# Patient Record
Sex: MALE | Race: WHITE | Employment: OTHER | URBAN - METROPOLITAN AREA
[De-identification: names, ages, dates, MRNs, and addresses within clinical notes are randomized per-mention and may not be internally consistent; named-entity substitution may affect disease eponyms.]

---

## 2017-07-07 PROBLEM — Z95.828 S/P AORTOBIFEMORAL BYPASS SURGERY: Chronic | Status: ACTIVE | Noted: 2017-07-07

## 2018-08-28 ENCOUNTER — OFFICE VISIT (OUTPATIENT)
Dept: PULMONOLOGY | Age: 68
End: 2018-08-28
Payer: MEDICARE

## 2018-08-28 VITALS
TEMPERATURE: 98.4 F | OXYGEN SATURATION: 95 % | DIASTOLIC BLOOD PRESSURE: 56 MMHG | WEIGHT: 205 LBS | BODY MASS INDEX: 31.07 KG/M2 | SYSTOLIC BLOOD PRESSURE: 118 MMHG | HEART RATE: 82 BPM | HEIGHT: 68 IN

## 2018-08-28 DIAGNOSIS — J43.2 CENTRILOBULAR EMPHYSEMA (HCC): ICD-10-CM

## 2018-08-28 LAB
DLCO %PRED: NORMAL
DLCO PRE: NORMAL
FEF 25-75%-POST: 0.8
FEF 25-75%-PRE: 0.7
FEV1-POST: 1.93
FEV1-PRE: 1.85
FEV1/FVC-POST: 56
FEV1/FVC-PRE: 58
FVC-POST: 3.44
FVC-PRE: 3.18
MEP: NORMAL
MIP: NORMAL
TLC %PRED: NORMAL
TLC PRE: NORMAL

## 2018-08-28 PROCEDURE — 99203 OFFICE O/P NEW LOW 30 MIN: CPT | Performed by: INTERNAL MEDICINE

## 2018-08-28 PROCEDURE — 99204 OFFICE O/P NEW MOD 45 MIN: CPT | Performed by: INTERNAL MEDICINE

## 2018-08-28 PROCEDURE — 94060 EVALUATION OF WHEEZING: CPT | Performed by: INTERNAL MEDICINE

## 2018-08-28 ASSESSMENT — PULMONARY FUNCTION TESTS
FVC_POST: 3.44
FEV1/FVC_PRE: 58
FEV1_PRE: 1.85
FEV1/FVC_POST: 56
FEV1_POST: 1.93
FVC_PRE: 3.18

## 2018-08-28 NOTE — PROGRESS NOTES
(SYMBICORT) 80-4.5 MCG/ACT AERO Inhale 2 puffs into the lungs 2 times daily Rinse mouth after use. 10.2 g 1    tiotropium (SPIRIVA RESPIMAT) 2.5 MCG/ACT AERS inhaler Inhale 2 puffs into the lungs daily 4 g 1    lisinopril-hydrochlorothiazide (PRINZIDE;ZESTORETIC) 20-12.5 MG per tablet TAKE 1 TABLET BY MOUTH DAILY 90 tablet 0    metoprolol succinate (TOPROL XL) 25 MG extended release tablet TAKE 1 TABLET BY MOUTH DAILY 90 tablet 3    isosorbide mononitrate (IMDUR) 60 MG extended release tablet TAKE 1 TABLET BY MOUTH DAILY 90 tablet 3    omeprazole (PRILOSEC) 40 MG delayed release capsule Take 1 capsule by mouth Daily 90 capsule 1    atorvastatin (LIPITOR) 80 MG tablet TAKE 1 TABLET BY MOUTH DAILY 90 tablet 3    aspirin 325 MG EC tablet Take 325 mg by mouth daily       therapeutic multivitamin-minerals (THERAGRAN-M) tablet Take 1 tablet by mouth daily. No current facility-administered medications for this visit. 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 Andi Najera- SVG-OM, SVG-PDA, LIMA-LAD- Open, SVG-CX closed    CARDIAC SURGERY      HERNIA REPAIR      UPPER GASTROINTESTINAL ENDOSCOPY      VASCULAR SURGERY  11-9-11    aorto-femoral bypass       FAMILY HISTORY:   Lung cancer:  DVT or PE     REVIEW OF SYSTEMS:  Constitutional: General health is good . There has been no weight changes. No fevers, fatigue or weakness. Head: Patient denies any history of trauma, convulsive disorder or syncope. Skin:  Patient denies history of changes in pigmentation, eruptions or pruritus. No easy bruising or bleeding.   EENT: no nasal congestion   Cardiovascular response  FEV1/FVC 58  Spirometry compatible with moderate obstruction COPD  IMPRESSION:    1-Moderate COPD   2-Emphysema   3-Asthma   4-weight gain               PLAN:      - Patient is very pleasant 77-year-old male who follow with Dr. Mik Pat and he has been treated very nicely for COPD with Symbicort and Spiriva    His spirometry today shows moderate COPD  -My recommendation is to continue his Symbicort and Spiriva as better Dr. Mik Pat  -will need CAT scan screening giving his COPD stage to make sure no lung nodule that needs followed up for lung mass  -Need to have the flu and a pneumonia vaccine  Want to go to pulmonary rehab  Eosinophilic count and respiratory allergen profile    See him in 2 months and further recommendation to follow        Thank you for allowing me to participate in [facility]. I will keep following with you ,should you have any concerns ,please contact me at [phone]     Sincerely,        Natalie Feliciano MD  Pulmonary & Critical Care Medicine     NOTE: This report was transcribed using voice recognition software. Every effort was made to ensure accuracy; however, inadvertent computerized transcription errors may be present.

## 2018-08-28 NOTE — PROGRESS NOTES
New pt to see Dr. Aubrey Mcneill today in office. Pt to continue Spiriva 2.5 and Symbicort 160 inhalers; samples given per Dr. Leonila Feng's orders. To have lab work and CXR prior to next appt in 3 mos; appt given. Spirometry completed; Dr. Aubrey Mcneill to review results.

## 2018-08-29 ENCOUNTER — HOSPITAL ENCOUNTER (OUTPATIENT)
Dept: CARDIOLOGY | Age: 68
Discharge: HOME | End: 2018-08-29
Payer: MEDICARE

## 2018-08-29 ENCOUNTER — OFFICE VISIT (OUTPATIENT)
Dept: VASCULAR SURGERY | Age: 68
End: 2018-08-29
Payer: MEDICARE

## 2018-08-29 VITALS
HEIGHT: 68 IN | DIASTOLIC BLOOD PRESSURE: 77 MMHG | WEIGHT: 200 LBS | BODY MASS INDEX: 30.31 KG/M2 | HEART RATE: 65 BPM | SYSTOLIC BLOOD PRESSURE: 139 MMHG

## 2018-08-29 DIAGNOSIS — I77.1 SUBCLAVIAN ARTERY STENOSIS, LEFT (HCC): ICD-10-CM

## 2018-08-29 DIAGNOSIS — Z95.828 S/P AORTOBIFEMORAL BYPASS SURGERY: Primary | Chronic | ICD-10-CM

## 2018-08-29 DIAGNOSIS — I73.9 PVD (PERIPHERAL VASCULAR DISEASE) WITH CLAUDICATION (HCC): ICD-10-CM

## 2018-08-29 DIAGNOSIS — Z95.828 S/P AORTOBIFEMORAL BYPASS SURGERY: Chronic | ICD-10-CM

## 2018-08-29 DIAGNOSIS — Z95.820 STATUS POST ANGIOPLASTY WITH STENT: ICD-10-CM

## 2018-08-29 PROCEDURE — 99213 OFFICE O/P EST LOW 20 MIN: CPT | Performed by: SURGERY

## 2018-08-29 PROCEDURE — 93923 UPR/LXTR ART STDY 3+ LVLS: CPT

## 2018-08-29 NOTE — PROGRESS NOTES
Chief Complaint:   Chief Complaint   Patient presents with    Follow-up     results sono upper ext art le arterial         HPI:  Patient came to the office, for evaluation of peripheral vascular disease, post aortobifemoral bypass, 8 years ago, also history of left subclavian artery angioplasty and stent placement by Dr. Karen Kong, overall doing well, stays active, plays golf regularly      Patient denies any focal lateralizing neurological symptoms like loss of speech, vision or loss of function of extremity    Patient can walk a few blocks without difficulty, and denies any symptoms of rest pain    No Known Allergies    Current Outpatient Prescriptions   Medication Sig Dispense Refill    budesonide-formoterol (SYMBICORT) 80-4.5 MCG/ACT AERO Inhale 2 puffs into the lungs 2 times daily Rinse mouth after use. 10.2 g 1    tiotropium (SPIRIVA RESPIMAT) 2.5 MCG/ACT AERS inhaler Inhale 2 puffs into the lungs daily 4 g 1    lisinopril-hydrochlorothiazide (PRINZIDE;ZESTORETIC) 20-12.5 MG per tablet TAKE 1 TABLET BY MOUTH DAILY 90 tablet 0    metoprolol succinate (TOPROL XL) 25 MG extended release tablet TAKE 1 TABLET BY MOUTH DAILY 90 tablet 3    isosorbide mononitrate (IMDUR) 60 MG extended release tablet TAKE 1 TABLET BY MOUTH DAILY 90 tablet 3    omeprazole (PRILOSEC) 40 MG delayed release capsule Take 1 capsule by mouth Daily 90 capsule 1    atorvastatin (LIPITOR) 80 MG tablet TAKE 1 TABLET BY MOUTH DAILY 90 tablet 3    aspirin 325 MG EC tablet Take 325 mg by mouth daily       therapeutic multivitamin-minerals (THERAGRAN-M) tablet Take 1 tablet by mouth daily. No current facility-administered medications for this visit.         Past Medical History:   Diagnosis Date    Arthritis     Asthma     States Dr. Magdiel Rasmussen says he has it, but does not take medication for this    Atherosclerotic peripheral vascular disease ([facility] [address] 11/9/2011    Blood transfusion     Unsure    CAD (coronary artery disease)    

## 2018-08-30 NOTE — PROCEDURES
[address]                   [address] [facility],  [facility]                                  VASCULAR REPORT    PATIENT NAME: Fabiola De La Rosa                  :        1950  MED REC NO:   [id number]                            ROOM:  ACCOUNT NO:   [de-identified]                           ADMIT DATE: 2018  PROVIDER:     William Kendall MD    DATE OF PROCEDURE:  2018    LOWER EXTREMITY ARTERIAL DOPPLER STUDY    INDICATION:  Status post aortofemoral bypass surgery. FINDINGS:  The lower extremity arterial Doppler study revealed that the  patient has mild femoropopliteal arterial occlusive disease on the left  side with ankle-brachial index of 0.87, on the right side the  ankle-brachial index is normalized at 0.94, unchanged from last year.         Dc Lombardo MD    D: 2018 14:04:27       T: 2018 17:25:32     [email or web address]  Job#: [id number]     Doc#: [id number]    CC:

## 2018-10-24 RX ORDER — ISOSORBIDE MONONITRATE 60 MG/1
60 TABLET, EXTENDED RELEASE ORAL DAILY
Qty: 90 TABLET | Refills: 3 | Status: SHIPPED | OUTPATIENT
Start: 2018-10-24 | End: 2019-10-13 | Stop reason: SDUPTHER

## 2019-02-01 ENCOUNTER — OFFICE VISIT (OUTPATIENT)
Dept: PULMONOLOGY | Age: 69
End: 2019-02-01
Payer: MEDICARE

## 2019-02-01 VITALS
WEIGHT: 213 LBS | HEART RATE: 83 BPM | BODY MASS INDEX: 32.28 KG/M2 | TEMPERATURE: 98.1 F | DIASTOLIC BLOOD PRESSURE: 81 MMHG | RESPIRATION RATE: 14 BRPM | SYSTOLIC BLOOD PRESSURE: 139 MMHG | HEIGHT: 68 IN

## 2019-02-01 DIAGNOSIS — J43.2 CENTRILOBULAR EMPHYSEMA (HCC): ICD-10-CM

## 2019-02-01 PROCEDURE — 99213 OFFICE O/P EST LOW 20 MIN: CPT | Performed by: INTERNAL MEDICINE

## 2019-07-17 ENCOUNTER — HOSPITAL ENCOUNTER (OUTPATIENT)
Age: 69
Discharge: HOME | End: 2019-07-19
Payer: MEDICARE

## 2019-07-17 ENCOUNTER — HOSPITAL ENCOUNTER (OUTPATIENT)
Dept: GENERAL RADIOLOGY | Age: 69
Discharge: HOME | End: 2019-07-19
Payer: MEDICARE

## 2019-07-17 DIAGNOSIS — J43.2 CENTRILOBULAR EMPHYSEMA (HCC): ICD-10-CM

## 2019-07-17 PROCEDURE — 71046 X-RAY EXAM CHEST 2 VIEWS: CPT

## 2019-08-13 ENCOUNTER — OFFICE VISIT (OUTPATIENT)
Dept: PULMONOLOGY | Age: 69
End: 2019-08-13
Payer: MEDICARE

## 2019-08-13 VITALS
RESPIRATION RATE: 18 BRPM | OXYGEN SATURATION: 96 % | DIASTOLIC BLOOD PRESSURE: 81 MMHG | HEIGHT: 67 IN | SYSTOLIC BLOOD PRESSURE: 194 MMHG | WEIGHT: 205 LBS | BODY MASS INDEX: 32.18 KG/M2 | HEART RATE: 69 BPM

## 2019-08-13 DIAGNOSIS — J43.2 CENTRILOBULAR EMPHYSEMA (HCC): Primary | ICD-10-CM

## 2019-08-13 PROCEDURE — 99213 OFFICE O/P EST LOW 20 MIN: CPT | Performed by: INTERNAL MEDICINE

## 2019-08-13 NOTE — PROGRESS NOTES
bypass surgery 2017    Status post angioplasty with stent 2018       MEDICATIONS:   Current Outpatient Medications   Medication Sig Dispense Refill    aspirin 81 MG tablet Take 81 mg by mouth daily      budesonide-formoterol (SYMBICORT) 80-4.5 MCG/ACT AERO Inhale 2 puffs into the lungs 2 times daily . Rinse mouth after use. 30.59 g 3    atorvastatin (LIPITOR) 80 MG tablet TAKE 1 TABLET BY MOUTH EVERY DAY 90 tablet 0    metoprolol succinate (TOPROL XL) 25 MG extended release tablet Take 1 tablet by mouth daily 90 tablet 1    omeprazole (PRILOSEC) 40 MG delayed release capsule TAKE 1 CAPSULE BY MOUTH DAILY 90 capsule 1    isosorbide mononitrate (IMDUR) 60 MG extended release tablet Take 1 tablet by mouth daily 90 tablet 3    tiotropium (SPIRIVA RESPIMAT) 2.5 MCG/ACT AERS inhaler Inhale 2 puffs into the lungs daily 12 Inhaler 3    lisinopril-hydrochlorothiazide (PRINZIDE;ZESTORETIC) 20-12.5 MG per tablet TAKE 1 TABLET BY MOUTH DAILY 90 tablet 0    therapeutic multivitamin-minerals (THERAGRAN-M) tablet Take 1 tablet by mouth daily.  lisinopril-hydrochlorothiazide (PRINZIDE;ZESTORETIC) 20-12.5 MG per tablet TAKE 1 TABLET BY MOUTH DAILY (Patient not taking: Reported on 2019) 90 tablet 0    aspirin 325 MG EC tablet Take 325 mg by mouth daily        No current facility-administered medications for this visit.         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 Jamin Cooper- SVG-OM, SVG-PDA, LIMA-LAD- Open, SVG-CX closed   [facility]

## 2019-08-21 ENCOUNTER — TELEPHONE (OUTPATIENT)
Dept: VASCULAR SURGERY | Age: 69
End: 2019-08-21

## 2019-08-28 DIAGNOSIS — Z95.820 STATUS POST ANGIOPLASTY WITH STENT: ICD-10-CM

## 2019-08-28 DIAGNOSIS — I73.9 PVD (PERIPHERAL VASCULAR DISEASE) WITH CLAUDICATION (HCC): Primary | ICD-10-CM

## 2019-08-28 DIAGNOSIS — Z95.828 S/P AORTOBIFEMORAL BYPASS SURGERY: ICD-10-CM

## 2019-08-29 ENCOUNTER — OFFICE VISIT (OUTPATIENT)
Dept: VASCULAR SURGERY | Age: 69
End: 2019-08-29
Payer: MEDICARE

## 2019-08-29 ENCOUNTER — HOSPITAL ENCOUNTER (OUTPATIENT)
Dept: CARDIOLOGY | Age: 69
Discharge: HOME | End: 2019-08-29
Payer: MEDICARE

## 2019-08-29 DIAGNOSIS — Z95.820 STATUS POST ANGIOPLASTY WITH STENT: ICD-10-CM

## 2019-08-29 DIAGNOSIS — I73.9 PVD (PERIPHERAL VASCULAR DISEASE) WITH CLAUDICATION (HCC): ICD-10-CM

## 2019-08-29 DIAGNOSIS — R09.89 BRUIT OF LEFT CAROTID ARTERY: ICD-10-CM

## 2019-08-29 DIAGNOSIS — Z95.828 S/P AORTOBIFEMORAL BYPASS SURGERY: Chronic | ICD-10-CM

## 2019-08-29 DIAGNOSIS — I73.9 PVD (PERIPHERAL VASCULAR DISEASE) WITH CLAUDICATION (HCC): Primary | ICD-10-CM

## 2019-08-29 DIAGNOSIS — Z95.828 S/P AORTOBIFEMORAL BYPASS SURGERY: ICD-10-CM

## 2019-08-29 PROCEDURE — 93923 UPR/LXTR ART STDY 3+ LVLS: CPT

## 2019-08-29 PROCEDURE — 99214 OFFICE O/P EST MOD 30 MIN: CPT | Performed by: SURGERY

## 2019-08-29 NOTE — PROGRESS NOTES
Unsure    CAD (coronary artery disease)     COPD (chronic obstructive pulmonary disease) ([facility] [address] 2011    GERD (gastroesophageal reflux disease)     History of blood transfusion     History of tobacco use 2011    Hyperlipidemia     Hypertension     Hypertension 2011    PVD (peripheral vascular disease) with claudication ([facility] [address] 2013    S/P aorto-bifemoral bypass surgery 10/25/2012    S/P aortobifemoral bypass surgery 2017    Status post angioplasty with stent 2018       Past Surgical History:   Procedure Laterality Date    ANKLE SURGERY      lt    CARDIAC CATHETERIZATION  2017    Dr. Kelin Avila- SVG-OM, SVG-PDA, LIMA-LAD- Open, SVG-CX closed   [facility] ENDOSCOPY      VASCULAR SURGERY  11    aorto-femoral bypass       Family History   Problem Relation Age of Onset    Heart Disease Mother     High Blood Pressure Mother     Diabetes Mother     High Cholesterol Mother     Stroke Mother     Heart Disease Father     High Blood Pressure Father     High Cholesterol Father     Asthma Sister     Heart Disease Brother     High Blood Pressure Brother     Cancer Brother     High Cholesterol Brother        Social History     Socioeconomic History    Marital status: Single     Spouse name: Not on file    Number of children: Not on file    Years of education: Not on file    Highest education level: Not on file   Occupational History    Not on file   Social Needs    Financial resource strain: Not on file    Food insecurity:     Worry: Not on file     Inability: Not on file    Transportation needs:     Medical: Not on file     Non-medical: Not on file   Tobacco Use    Smoking status: Former Smoker     Packs/day: 2.00     Years: 20.00     Pack years: 40.00     Types: Cigarettes     Last attempt to quit: 10/25/1992     Years since quittin.8    Smokeless tobacco: Never Used   Substance and Sexual Activity

## 2019-10-14 RX ORDER — ISOSORBIDE MONONITRATE 60 MG/1
TABLET, EXTENDED RELEASE ORAL
Qty: 90 TABLET | Refills: 3 | Status: SHIPPED
Start: 2019-10-14 | End: 2020-05-29 | Stop reason: SDUPTHER

## 2019-10-16 ENCOUNTER — HOSPITAL ENCOUNTER (OUTPATIENT)
Dept: CARDIOLOGY | Age: 69
Discharge: HOME | End: 2019-10-16
Payer: MEDICARE

## 2019-10-16 DIAGNOSIS — R09.89 BRUIT OF LEFT CAROTID ARTERY: ICD-10-CM

## 2019-10-16 PROCEDURE — 93880 EXTRACRANIAL BILAT STUDY: CPT

## 2019-10-18 ENCOUNTER — TELEPHONE (OUTPATIENT)
Dept: VASCULAR SURGERY | Age: 69
End: 2019-10-18

## 2019-10-18 PROBLEM — I65.22 LEFT CAROTID STENOSIS: Status: ACTIVE | Noted: 2019-10-18

## 2020-02-21 ENCOUNTER — OFFICE VISIT (OUTPATIENT)
Dept: PULMONOLOGY | Age: 70
End: 2020-02-21
Payer: MEDICARE

## 2020-02-21 VITALS
OXYGEN SATURATION: 95 % | HEIGHT: 68 IN | DIASTOLIC BLOOD PRESSURE: 63 MMHG | WEIGHT: 215 LBS | BODY MASS INDEX: 32.58 KG/M2 | RESPIRATION RATE: 18 BRPM | HEART RATE: 67 BPM | SYSTOLIC BLOOD PRESSURE: 143 MMHG

## 2020-02-21 PROCEDURE — 99213 OFFICE O/P EST LOW 20 MIN: CPT | Performed by: INTERNAL MEDICINE

## 2020-02-21 NOTE — PROGRESS NOTES
6 mos follow up in office; Symbicort 80  and Spiriva 2.5 samples given per Dr. Lindsay Feng's orders. Doing well with meds; no complaints. Follow up in office in 6 mos; appt given.
REVIEW OF SYSTEMS:  Constitutional: General health is good . There has been no weight changes. No fevers, fatigue or weakness. Head: Patient denies any history of trauma, convulsive disorder or syncope. Skin:  Patient denies history of changes in pigmentation, eruptions or pruritus. No easy bruising or bleeding. EENT: no nasal congestion   Cardiovascular ,No chest pain ,No edema ,  Respiration:SOB: +,LA :+  Gastrointestinal:No GI bleed ,no melena  ,no hematemesis    Musculoskeletal: no joint pain ,no swelling  Neurological:no , syncope. Denies twitching, convulsions, loss of consciousness or memory. Endocrine:  . No history of goiter, exophthalmos or dryness of skin. The patient has no history of diabetes. Hematopoietic:  No history of bleeding disorders or easy bruising. Rheumatic:  No connective tissue disease history or polyarthritis/inflammatory joint disease. PHYSICAL EXAMINATION:  Vitals:    02/21/20 0838   BP: (!) 143/63   Pulse: 67   Resp: 18   SpO2: 95%     Constitutional: This is a well developed, well nourished 71y.o. year old male who is alert, oriented, cooperative and in no apparent distress. Head was normocephalic and atraumatic. EENT: Mallampati class :  Extraocular muscles intact. External canals are patent and no discharge was appreciated. Septum was midline,   mucosa was without erythema, exudates or cobblestoning. No thrush was noted. Neck: Supple without thyromegaly. No elevated JVP. Trachea was midline. No carotid bruits were auscultated. Respiratory: Rhonchi     Cardiovascular: Regular without murmur, clicks, gallops or rubs. There is no left or right ventricular heave. Pulses:  Carotid, radial and femoral pulses were equally bilaterally. Abdomen: Slightly rounded and soft without organomegaly. No rebound, rigidity or guarding was appreciated. Lymphatic: No lymphadenopathy.   Musculoskeletal: no joint deformity   Extremities: no edema

## 2020-09-03 ENCOUNTER — VIRTUAL VISIT (OUTPATIENT)
Dept: PULMONOLOGY | Age: 70
End: 2020-09-03
Payer: MEDICARE

## 2020-09-03 PROCEDURE — 99442 PR PHYS/QHP TELEPHONE EVALUATION 11-20 MIN: CPT | Performed by: INTERNAL MEDICINE

## 2020-09-03 NOTE — PROGRESS NOTES
Department of Internal Medicine  Division of Pulmonary, Critical Care & Sleep Medicine  Pulmonary [address] [facility]                                             Pulmonary Clinic Consult     I had the pleasure of seeing  Clive Neal  in the [address] regarding their Cough       Chief Complaint   Patient presents with    COPD       HISTORY OF PRESENT ILLNESS:    Clive Neal is a 79y.o. year old  Who start smoking at age 21  And increase gradually  2 pack and in then quit 28 years ago ,give him 36 pack year history    Was treated with dr Mee Ahumada for bronchitis and he is on Spiriva and Symbicort   The Patient comes in with SOB that has been going on few years  Associated with .mild cough He states that it get worse with exercise or walking long distance and he can walk less than 1 mile  And go 1-2 flight of stairs before get short winded        Sleep history :  Snoring No   Feel tired during the day Yes   Wake up fresh Yes   excessive daytime sleepiness yes       Today Visit   He has been doing better and rarely use his rescue inhalers,    He states that his SOB has mproved with no fever or chill and no chest  He can walk with no issues   Still able to walk 3 miles  No LA  No cough         ALLERGIES:  No Known Allergies    PAST MEDICAL HISTORY:       Diagnosis Date    Arthritis     Asthma     States Dr. Hermilo Bonilla says he has it, but does not take medication for this    Atherosclerotic peripheral vascular disease ([address] 11/9/2011    Blood transfusion     Unsure    CAD (coronary artery disease)     COPD (chronic obstructive pulmonary disease) ([address] 11/9/2011    GERD (gastroesophageal reflux disease)     History of blood transfusion     History of tobacco use 11/9/2011    Hyperlipidemia     Hypertension     Hypertension 11/9/2011    Left carotid stenosis 10/18/2019    PVD (peripheral vascular disease) with claudication ([address] 12/4/2013    S/P aorto-bifemoral bypass surgery 10/25/2012    S/P aortobifemoral bypass surgery 2017    Status post angioplasty with stent 2018       MEDICATIONS:   Current Outpatient Medications   Medication Sig Dispense Refill    atorvastatin (LIPITOR) 80 MG tablet TAKE 1 TABLET BY MOUTH EVERY DAY 90 tablet 1    metoprolol succinate (TOPROL XL) 25 MG extended release tablet TAKE 1 TABLET BY MOUTH EVERY DAY 90 tablet 1    lisinopril-hydroCHLOROthiazide (PRINZIDE;ZESTORETIC) 20-12.5 MG per tablet TAKE 1 TABLET BY MOUTH EVERY DAY 90 tablet 1    isosorbide mononitrate (IMDUR) 60 MG extended release tablet TAKE 1 TABLET BY MOUTH EVERY DAY 90 tablet 1    omeprazole (PRILOSEC) 40 MG delayed release capsule Take 1 capsule by mouth Daily 90 capsule 1    aspirin 81 MG tablet Take 81 mg by mouth daily      budesonide-formoterol (SYMBICORT) 80-4.5 MCG/ACT AERO Inhale 2 puffs into the lungs 2 times daily . Rinse mouth after use. 30.59 g 3    tiotropium (SPIRIVA RESPIMAT) 2.5 MCG/ACT AERS inhaler Inhale 2 puffs into the lungs daily 12 Inhaler 3    therapeutic multivitamin-minerals (THERAGRAN-M) tablet Take 1 tablet by mouth daily. No current facility-administered medications for this visit.         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 Sharon Doss- SVG-OM, SVG-PDA, LIMA-LAD- Open, SVG-CX closed    CARDIAC SURGERY      HERNIA REPAIR      UPPER GASTROINTESTINAL ENDOSCOPY      VASCULAR SURGERY  11    aorto-femoral bypass       FAMILY HISTORY:   Lung cancer:  DVT or PE     REVIEW OF SYSTEMS:  Constitutional: General health is good . There has been no weight changes. No fevers, fatigue or weakness. Head: Patient denies any history of trauma, convulsive disorder or syncope. Skin:  Patient denies history of changes in pigmentation, eruptions or pruritus. No easy bruising or bleeding. EENT: no nasal congestion   Cardiovascular ,No chest pain ,No edema ,  Respiration:SOB: +,LA :+  Gastrointestinal:No GI bleed ,no melena  ,no hematemesis    Musculoskeletal: no joint pain ,no swelling  Neurological:no , syncope. Denies twitching, convulsions, loss of consciousness or memory. Endocrine:  . No history of goiter, exophthalmos or dryness of skin. The patient has no history of diabetes. Hematopoietic:  No history of bleeding disorders or easy bruising. Rheumatic:  No connective tissue disease history or polyarthritis/inflammatory joint disease. PHYSICAL EXAMINATION:  There were no vitals filed for this visit. This is phone visit    DATA:   FVC 3.18 which is 79 predicted  FEV1 1.85 which is 60% of predicted  Was no bronchodilator response  FEV1/FVC 58  Spirometry compatible with moderate obstruction COPD          IMPRESSION:    1-Moderate COPD   2-Emphysema   3-Asthma   4-weight Loss ,he decrease his intake and exercise more ,furtjer work up as per Dr Bernice Butler:      - Patient is very pleasant 63-year-old male who follow with Dr. Malia Reyes and he has been treated very nicely for COPD with Symbicort and Spiriva doing   He use his inhalers as needed ,he was   Symbicort to the 80/4.5 since he is doing great ,and he is doing great   His spirometry today shows moderate COPD    Again He did not want Ct chest ,understand the risk and benefits and he want to hold for now ,he was ok with CXR and it was ok       Did not Want to go to pulmonary rehab.     Eosinophilic count and respiratory allergen profile not done       Did not want screen CT     Thank you for allowing me to participate in [facility]. I will keep following with you ,should you have any concerns ,please contact me at [phone]     Sincerely,        Eun Baeza MD  Pulmonary & Critical Care Medicine     NOTE: This report was transcribed using voice recognition software. Every effort was made to ensure accuracy; however, inadvertent computerized transcription errors may be present.

## 2020-09-03 NOTE — PROGRESS NOTES
TeleMedicine Video Visit    This visit was performed as a virtual video visit using a telephone visit. Patient identification was verified at the start of the visit, including the patient's telephone number and physical location. I discussed with the patient the nature of our telehealth visits, that:     1. Due to the nature of an audio- video modality, the only components of a physical exam that could be done are the elements supported by direct observation. 2. I would evaluate the patient and recommend diagnostics and treatments based on my assessment. 3. If it was felt that the patient should be evaluated in clinic or an emergency room setting, then they would be directed there. 4. Our sessions are not being recorded and that personal health information is protected. 5. Our team would provide follow up care in person if/when the patient needs it. Patient does agree to proceed with telemedicine consultation. Patient's location: pt home  Physician  location [place] [address] other people involved in call N/A      Time spent: Greater than 20    This visit was completed virtually using telephone. 6 mos follow up via phone call. Using Symbicort 80 and Spiriva with good results. Plan for pt to continue inhaler. Plan for follow up in office in 1 year. Appt card mailed.

## 2020-09-10 ENCOUNTER — OFFICE VISIT (OUTPATIENT)
Dept: VASCULAR SURGERY | Age: 70
End: 2020-09-10
Payer: MEDICARE

## 2020-09-10 ENCOUNTER — HOSPITAL ENCOUNTER (OUTPATIENT)
Dept: CARDIOLOGY | Age: 70
Discharge: HOME | End: 2020-09-10
Payer: MEDICARE

## 2020-09-10 VITALS — SYSTOLIC BLOOD PRESSURE: 156 MMHG | DIASTOLIC BLOOD PRESSURE: 81 MMHG

## 2020-09-10 PROCEDURE — 93923 UPR/LXTR ART STDY 3+ LVLS: CPT

## 2020-09-10 PROCEDURE — 99214 OFFICE O/P EST MOD 30 MIN: CPT | Performed by: SURGERY

## 2020-09-10 NOTE — PROGRESS NOTES
Chief Complaint:   Chief Complaint   Patient presents with    Follow-up     pt here for follow up PVD. HPI: Patient came to the office for evaluation of multiple vascular issues, left carotid stenosis, history of left subclavian angioplasty and stent placement, peripheral vascular disease status post aortobifemoral bypass surgery, overall doing well    Patient denies any symptoms in the left upper extremity    Denies abdominal pain or back pain    Patient tells me that his blood pressure, cardiac conditions are stable      Patient denies any focal lateralizing neurological symptoms like loss of speech, vision or loss of function of extremity    Patient can walk a few blocks , and denies any symptoms of rest pain    No Known Allergies    Current Outpatient Medications   Medication Sig Dispense Refill    atorvastatin (LIPITOR) 80 MG tablet TAKE 1 TABLET BY MOUTH EVERY DAY 90 tablet 1    metoprolol succinate (TOPROL XL) 25 MG extended release tablet TAKE 1 TABLET BY MOUTH EVERY DAY 90 tablet 1    lisinopril-hydroCHLOROthiazide (PRINZIDE;ZESTORETIC) 20-12.5 MG per tablet TAKE 1 TABLET BY MOUTH EVERY DAY 90 tablet 1    isosorbide mononitrate (IMDUR) 60 MG extended release tablet TAKE 1 TABLET BY MOUTH EVERY DAY 90 tablet 1    aspirin 81 MG tablet Take 81 mg by mouth daily      budesonide-formoterol (SYMBICORT) 80-4.5 MCG/ACT AERO Inhale 2 puffs into the lungs 2 times daily . Rinse mouth after use. 30.59 g 3    tiotropium (SPIRIVA RESPIMAT) 2.5 MCG/ACT AERS inhaler Inhale 2 puffs into the lungs daily 12 Inhaler 3    therapeutic multivitamin-minerals (THERAGRAN-M) tablet Take 1 tablet by mouth daily.  omeprazole (PRILOSEC) 40 MG delayed release capsule Take 1 capsule by mouth Daily (Patient not taking: Reported on 9/10/2020) 90 capsule 1     No current facility-administered medications for this visit.         Past Medical History:   Diagnosis Date    Arthritis     Asthma     States Dr. July Hill says he has it, but does not take medication for this    Atherosclerotic peripheral vascular disease ([facility] 11/9/2011    Blood transfusion     Unsure    CAD (coronary artery disease)     COPD (chronic obstructive pulmonary disease) ([facility] [facility] 11/9/2011    GERD (gastroesophageal reflux disease)     History of blood transfusion     History of tobacco use 11/9/2011    Hyperlipidemia     Hypertension     Hypertension 11/9/2011    Left carotid stenosis 10/18/2019    PVD (peripheral vascular disease) with claudication ([facility] [address] [facility] 12/4/2013    S/P aorto-bifemoral bypass surgery 10/25/2012    S/P aortobifemoral bypass surgery 7/7/2017    Status post angioplasty with stent 8/29/2018       Past Surgical History:   Procedure Laterality Date    ANKLE SURGERY  2006    lt    CARDIAC CATHETERIZATION  05/16/2017    Dr. Dwain Bedolla- SVG-OM, SVG-PDA, LIMA-LAD- Open, SVG-CX closed   [facility] ENDOSCOPY      VASCULAR SURGERY  11-9-11    aorto-femoral bypass       Family History   Problem Relation Age of Onset    Heart Disease Mother     High Blood Pressure Mother     Diabetes Mother     High Cholesterol Mother     Stroke Mother     Heart Disease Father     High Blood Pressure Father     High Cholesterol Father     Asthma Sister     Heart Disease Brother     High Blood Pressure Brother     Cancer Brother     High Cholesterol Brother        Social History     Socioeconomic History    Marital status: Single     Spouse name: Not on file    Number of children: Not on file    Years of education: Not on file    Highest education level: Not on file   Occupational History    Not on file   Social Needs    Financial resource strain: Not on file    Food insecurity     Worry: Not on file     Inability: Not on file    Transportation needs     Medical: Not on file     Non-medical: Not on file   Tobacco Use    Smoking status: Former Smoker     Packs/day: 2.00     Years: 20.00 Pack years: 40.00     Types: Cigarettes     Last attempt to quit: 10/25/1992     Years since quittin.8    Smokeless tobacco: Never Used   Substance and Sexual Activity    Alcohol use: Yes     Alcohol/week: 2.0 standard drinks     Types: 2 Cans of beer per week     Comment: occasional    Drug use: No    Sexual activity: Not on file   Lifestyle    Physical activity     Days per week: Not on file     Minutes per session: Not on file    Stress: Not on file   Relationships    Social connections     Talks on phone: Not on file     Gets together: Not on file     Attends Congregational service: Not on file     Active member of club or organization: Not on file     Attends meetings of clubs or organizations: Not on file     Relationship status: Not on file    Intimate partner violence     Fear of current or ex partner: Not on file     Emotionally abused: Not on file     Physically abused: Not on file     Forced sexual activity: Not on file   Other Topics Concern    Not on file   Social History Narrative    Not on file       Review of Systems:    Eyes:  No blurring, diplopia or vision loss. Respiratory:  No cough, pleuritic chest pain, dyspnea, or wheezing. Chronic obstructive lung disease  Cardiovascular: No angina, palpitations . Coronary artery disease, hypertension, hyperlipidemia  Musculoskeletal:  No arthritis or weakness. Neurologic:  No paralysis, paresis, paresthesia, seizures or headache. Gastrointestinal: GERD      Physical Exam:  General appearance:  Alert, awake, oriented x 3. No distress. Eyes:  Conjunctivae appear normal; PERRL  Neck:  No jugular venous distention, lymphadenopathy or thyromegaly. Carotid bruit noted on the left  Lungs:  Clear to ausculation bilaterally. No rhonchi, crackles, wheezes  Heart:  Regular rate and rhythm. No rub or murmur  Abdomen:  Soft, non-tender. No masses, organomegaly. Musculoskeletal : No joint effusions, tenderness swelling    Neuro: Speech is intact. Moving all extremities. No focal motor or sensory deficits      Extremities:  Both feet are warm to touch. The color of both feet is normal.        Pulses Right  Left    Brachial 3 3    Radial    3=normal   Femoral 2 2  2=diminished   Popliteal    1=barely palpable   Dorsalis pedis    0=absent   Posterior tibial 2 2  4=aneurysmal             Other pertinent information:1. The past medical records were reviewed. Assessment:    1. PVD (peripheral vascular disease) with claudication ([address]    2. S/P aortobifemoral bypass surgery    3. Status post angioplasty with stent    4. Bruit of left carotid artery    5. Left carotid stenosis              Plan:       Discussed the patient regarding the results of the upper committee arterial Doppler study equal systolic blood pressure bilaterally, patent left subclavian angioplasty and stent site, the lower extremity artery Doppler study revealed normal ankle minutes on the right, mild left femoral-popliteal arterial occlusive disease, with ankle-brachial is 0.86, stable    Patient recommended next year, follow-up carotid ultrasound to monitor the left carotid artery disease and call me immediately if any focal lateralizing neurologic symptoms, explained              Patient was instructed to continue walking program and to call if any worsening of symptoms and to call if any focal lateralizing neurological symptoms like loss of speech, vision or loss of function of extremity. All the questions were answered. Indicated follow-up: Return in about 1 year (around 9/10/2021), or if symptoms worsen or fail to improve.

## 2020-09-14 NOTE — PROCEDURES
[address]                  [address] [facility],  [facility]                                VASCULAR REPORT    PATIENT NAME: Ysabel Gillespie                  :        1950  MED REC NO:   [id number]                            ROOM:  ACCOUNT NO:   [de-identified]                           ADMIT DATE: 09/10/2020  PROVIDER:     Dev Multani MD    DATE OF PROCEDURE:  09/10/2020    UPPER EXTREMITY ARTERIAL DOPPLER STUDY    INDICATION:  History of left subclavian artery stenosis, post left  subclavian artery angioplasty and stent placement. FINDINGS:  Upper extremity arterial Doppler study revealed that the  patient had satisfactory arterial circulation of both upper extremities,  equal brachial systolic pressures of 096 mmHg on the right, and 132 mmHg  on the left side, with good flow to both arms based upon the pulse  volume recordings.         Valentino Child, MD    D: 2020 17:16:32       T: 2020 0:00:40     SERGIO/AN_DACIA_SISSY  Job#: [id number]     Doc#: [id number]

## 2020-09-14 NOTE — PROCEDURES
[address]                  [address] [facility],  [facility]                                VASCULAR REPORT    PATIENT NAME: Negra Conner                  :        1950  MED REC NO:   [id number]                            ROOM:  ACCOUNT NO:   [de-identified]                           ADMIT DATE: 09/10/2020  PROVIDER:     Mauricio Flaherty MD    DATE OF PROCEDURE:  09/10/2020    LOWER EXTREMITY ARTERIAL DOPPLER STUDY    INDICATION:  Difficulty walking. FINDINGS:  The lower extremity arterial Doppler study revealed that the  patient has Doppler evidence of mild femoropopliteal arterial occlusive  disease on the left with an ankle-brachial index of 0.86, on the right  the ankle-brachial index is within normal range. IMPRESSION:  Mild left femoropopliteal arterial occlusive disease with  an ankle-brachial index of 0.86, status post aortobifemoral bypass  surgery, unchanged from last year.         Nikki Doan MD    D: 2020 17:15:44       T: 2020 23:49:02     SERGIO/AN_DACIA_SISSY  Job#: [id number]     Doc#: [id number]
No

## 2021-02-13 ENCOUNTER — IMMUNIZATION (OUTPATIENT)
Dept: PRIMARY CARE CLINIC | Age: 71
End: 2021-02-13
Payer: MEDICARE

## 2021-02-13 PROCEDURE — 91300 COVID-19, PFIZER VACCINE 30MCG/0.3ML DOSE: CPT | Performed by: EMERGENCY MEDICINE

## 2021-02-13 PROCEDURE — 0001A COVID-19, PFIZER VACCINE 30MCG/0.3ML DOSE: CPT | Performed by: EMERGENCY MEDICINE

## 2021-03-08 ENCOUNTER — IMMUNIZATION (OUTPATIENT)
Dept: PRIMARY CARE CLINIC | Age: 71
End: 2021-03-08
Payer: MEDICARE

## 2021-03-08 PROCEDURE — 0002A COVID-19, PFIZER VACCINE 30MCG/0.3ML DOSE: CPT | Performed by: PHYSICIAN ASSISTANT

## 2021-03-08 PROCEDURE — 91300 COVID-19, PFIZER VACCINE 30MCG/0.3ML DOSE: CPT | Performed by: PHYSICIAN ASSISTANT

## 2021-08-31 ENCOUNTER — OFFICE VISIT (OUTPATIENT)
Dept: PULMONOLOGY | Age: 71
End: 2021-08-31
Payer: MEDICARE

## 2021-08-31 VITALS
RESPIRATION RATE: 16 BRPM | HEART RATE: 58 BPM | BODY MASS INDEX: 25.71 KG/M2 | TEMPERATURE: 97.5 F | DIASTOLIC BLOOD PRESSURE: 66 MMHG | SYSTOLIC BLOOD PRESSURE: 137 MMHG | HEIGHT: 67 IN | OXYGEN SATURATION: 97 % | WEIGHT: 163.8 LBS

## 2021-08-31 DIAGNOSIS — J44.9 CHRONIC OBSTRUCTIVE PULMONARY DISEASE, UNSPECIFIED COPD TYPE (HCC): ICD-10-CM

## 2021-08-31 PROCEDURE — G8427 DOCREV CUR MEDS BY ELIG CLIN: HCPCS | Performed by: INTERNAL MEDICINE

## 2021-08-31 PROCEDURE — 3023F SPIROM DOC REV: CPT | Performed by: INTERNAL MEDICINE

## 2021-08-31 PROCEDURE — 3017F COLORECTAL CA SCREEN DOC REV: CPT | Performed by: INTERNAL MEDICINE

## 2021-08-31 PROCEDURE — 99213 OFFICE O/P EST LOW 20 MIN: CPT | Performed by: INTERNAL MEDICINE

## 2021-08-31 PROCEDURE — 1036F TOBACCO NON-USER: CPT | Performed by: INTERNAL MEDICINE

## 2021-08-31 PROCEDURE — 1123F ACP DISCUSS/DSCN MKR DOCD: CPT | Performed by: INTERNAL MEDICINE

## 2021-08-31 PROCEDURE — G8417 CALC BMI ABV UP PARAM F/U: HCPCS | Performed by: INTERNAL MEDICINE

## 2021-08-31 PROCEDURE — G8926 SPIRO NO PERF OR DOC: HCPCS | Performed by: INTERNAL MEDICINE

## 2021-08-31 PROCEDURE — 4040F PNEUMOC VAC/ADMIN/RCVD: CPT | Performed by: INTERNAL MEDICINE

## 2021-08-31 NOTE — PROGRESS NOTES
1 year follow up visit in office. Pt reports he was + COVID in Jan and reports some residual effects and would like to discuss today with Dr. Chinedu Loredo. Pt reports he does not have shortness of breath and inhalers Symbicort and Spiriva working well. Discussed use of Symbicort only as needed with flare ups. Discussed booster shot and encouraged to get 3rd vaccine when available and indicated for COPD Dx. Follow up appt in 1 year; appt card to be mailed.

## 2021-08-31 NOTE — PROGRESS NOTES
Department of Internal Medicine  Division of Pulmonary, Critical Care & Sleep Medicine  Pulmonary [address] [facility]                                             Pulmonary Clinic Consult     I had the pleasure of seeing  Terrance Oneil  in the [address] [facility] regarding their Cough       Chief Complaint   Patient presents with    Follow-up     1 year    COPD       HISTORY OF PRESENT ILLNESS:    Terrance Oneil is a 70y.o. year old  Who start smoking at age 21  And increase gradually  2 pack and in then quit 28 years ago ,give him 36 pack year history    Was treated with dr Rojelio Carter for bronchitis and he is on Spiriva and Symbicort   The Patient comes in with SOB that has been going on few years  Associated with .mild cough He states that it get worse with exercise or walking long distance and he can walk less than 1 mile  And go 1-2 flight of stairs before get short winded        Sleep history :  Snoring No   Feel tired during the day Yes   Wake up fresh Yes   excessive daytime sleepiness yes       Today Visit   He has been doing better and rarely use his rescue inhalers,    Had COVID in [address] and to did very well   He states that his SOB has mproved with no fever or chill and no chest  He can walk with no issues   Still able to walk 3 miles  No LA  No cough         ALLERGIES:  No Known Allergies    PAST MEDICAL HISTORY:       Diagnosis Date    Arthritis     Asthma     States Dr. Saurabh Squires says he has it, but does not take medication for this    Atherosclerotic peripheral vascular disease ([address] 11/9/2011    Blood transfusion     Unsure    CAD (coronary artery disease)     COPD (chronic obstructive pulmonary disease) ([address] 11/9/2011    GERD (gastroesophageal reflux disease)     History of blood transfusion     History of tobacco use 11/9/2011    Hyperlipidemia     Hypertension     Hypertension 11/9/2011    Left carotid stenosis 10/18/2019    PVD (peripheral vascular disease) with claudication ([facility] [address] 2013    S/P aorto-bifemoral bypass surgery 10/25/2012    S/P aortobifemoral bypass surgery 2017    Status post angioplasty with stent 2018       MEDICATIONS:   Current Outpatient Medications   Medication Sig Dispense Refill    budesonide-formoterol (SYMBICORT) 80-4.5 MCG/ACT AERO Inhale 2 puffs into the lungs 2 times daily . Rinse mouth after use. 30.59 g 3    tiotropium (SPIRIVA RESPIMAT) 2.5 MCG/ACT AERS inhaler Inhale 2 puffs into the lungs daily 12 Inhaler 3    atorvastatin (LIPITOR) 80 MG tablet TAKE 1 TABLET BY MOUTH EVERY DAY 90 tablet 1    metoprolol succinate (TOPROL XL) 25 MG extended release tablet TAKE 1 TABLET BY MOUTH EVERY DAY 90 tablet 1    lisinopril-hydroCHLOROthiazide (PRINZIDE;ZESTORETIC) 20-12.5 MG per tablet TAKE 1 TABLET BY MOUTH EVERY DAY 90 tablet 1    isosorbide mononitrate (IMDUR) 60 MG extended release tablet TAKE 1 TABLET BY MOUTH EVERY DAY 90 tablet 1    omeprazole (PRILOSEC) 40 MG delayed release capsule Take 1 capsule by mouth Daily (Patient not taking: Reported on 9/10/2020) 90 capsule 1    aspirin 81 MG tablet Take 81 mg by mouth daily      therapeutic multivitamin-minerals (THERAGRAN-M) tablet Take 1 tablet by mouth daily. No current facility-administered medications for this visit.        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 Alba Valdes- SVG-OM, SVG-PDA, LIMA-LAD- Open, SVG-CX closed   [facility] rehab. Eosinophilic count and respiratory allergen profile not done       Had COVID and followed by vaccine   wany CXr but states his PCP will order    Thank you for allowing [facility] to [facility]. I will keep following with you ,should you have any concerns ,please contact me at [phone]     Sincerely,        Sourav Almeida MD  Pulmonary & Critical Care Medicine     NOTE: This report was transcribed using voice recognition software. Every effort was made to ensure accuracy; however, inadvertent computerized transcription errors may be present.

## 2021-09-02 ENCOUNTER — TELEPHONE (OUTPATIENT)
Dept: VASCULAR SURGERY | Age: 71
End: 2021-09-02

## 2021-09-02 DIAGNOSIS — I65.23 BILATERAL CAROTID ARTERY STENOSIS: ICD-10-CM

## 2021-09-02 DIAGNOSIS — I73.9 PERIPHERAL VASCULAR DISEASE (HCC): Primary | ICD-10-CM

## 2021-09-02 NOTE — TELEPHONE ENCOUNTER
Left message with answering machine for the appt. of us and to reschedule appt. With Dr Vivien Navarro.

## 2021-09-28 ENCOUNTER — IMMUNIZATION (OUTPATIENT)
Dept: PRIMARY CARE CLINIC | Age: 71
End: 2021-09-28
Payer: MEDICARE

## 2021-09-28 PROCEDURE — 91300 COVID-19, PFIZER VACCINE 30MCG/0.3ML DOSE: CPT | Performed by: FAMILY MEDICINE

## 2021-09-28 PROCEDURE — 0003A COVID-19, PFIZER VACCINE 30MCG/0.3ML DOSE: CPT | Performed by: FAMILY MEDICINE

## 2021-10-13 ENCOUNTER — HOSPITAL ENCOUNTER (OUTPATIENT)
Dept: INTERVENTIONAL RADIOLOGY/VASCULAR | Age: 71
Discharge: HOME | End: 2021-10-15
Payer: MEDICARE

## 2021-10-13 ENCOUNTER — HOSPITAL ENCOUNTER (OUTPATIENT)
Dept: ULTRASOUND IMAGING | Age: 71
Discharge: HOME | End: 2021-10-15
Payer: MEDICARE

## 2021-10-13 DIAGNOSIS — I65.23 BILATERAL CAROTID ARTERY STENOSIS: ICD-10-CM

## 2021-10-13 DIAGNOSIS — I73.9 PERIPHERAL VASCULAR DISEASE (HCC): ICD-10-CM

## 2021-10-13 PROCEDURE — 93923 UPR/LXTR ART STDY 3+ LVLS: CPT

## 2021-10-13 PROCEDURE — 93880 EXTRACRANIAL BILAT STUDY: CPT

## 2021-10-17 NOTE — PROGRESS NOTES
The carotid ultrasound was personally reviewed, based upon the atherosclerotic plaque and the velocities, my personal interpretation, 50% plus stenosis on the right side, approximately 65 stenosis on the left side with worsening, bilaterally.     The lower extremity arterial Doppler study personally reviewed by me revealed slight worsening, with an ankle-brachial index of 0.83 on the right and 0.81 on the left with adequate collateral flow to both feet based upon pulse volume recordings

## 2021-10-20 ENCOUNTER — TELEPHONE (OUTPATIENT)
Dept: VASCULAR SURGERY | Age: 71
End: 2021-10-20

## 2021-10-21 ENCOUNTER — OFFICE VISIT (OUTPATIENT)
Dept: VASCULAR SURGERY | Age: 71
End: 2021-10-21
Payer: MEDICARE

## 2021-10-21 VITALS — BODY MASS INDEX: 26.07 KG/M2 | HEIGHT: 68 IN | WEIGHT: 172 LBS

## 2021-10-21 DIAGNOSIS — Z95.820 STATUS POST ANGIOPLASTY WITH STENT: ICD-10-CM

## 2021-10-21 DIAGNOSIS — I65.22 LEFT CAROTID STENOSIS: ICD-10-CM

## 2021-10-21 DIAGNOSIS — I73.9 PVD (PERIPHERAL VASCULAR DISEASE) WITH CLAUDICATION (HCC): ICD-10-CM

## 2021-10-21 DIAGNOSIS — R09.89 BRUIT OF LEFT CAROTID ARTERY: Primary | ICD-10-CM

## 2021-10-21 DIAGNOSIS — Z95.828 S/P AORTOBIFEMORAL BYPASS SURGERY: Chronic | ICD-10-CM

## 2021-10-21 PROCEDURE — G8484 FLU IMMUNIZE NO ADMIN: HCPCS | Performed by: SURGERY

## 2021-10-21 PROCEDURE — 4040F PNEUMOC VAC/ADMIN/RCVD: CPT | Performed by: SURGERY

## 2021-10-21 PROCEDURE — 1123F ACP DISCUSS/DSCN MKR DOCD: CPT | Performed by: SURGERY

## 2021-10-21 PROCEDURE — 3017F COLORECTAL CA SCREEN DOC REV: CPT | Performed by: SURGERY

## 2021-10-21 PROCEDURE — G8417 CALC BMI ABV UP PARAM F/U: HCPCS | Performed by: SURGERY

## 2021-10-21 PROCEDURE — 1036F TOBACCO NON-USER: CPT | Performed by: SURGERY

## 2021-10-21 PROCEDURE — G8427 DOCREV CUR MEDS BY ELIG CLIN: HCPCS | Performed by: SURGERY

## 2021-10-21 PROCEDURE — 99214 OFFICE O/P EST MOD 30 MIN: CPT | Performed by: SURGERY

## 2021-10-21 NOTE — PROGRESS NOTES
Chief Complaint:   Chief Complaint   Patient presents with   [place]     carotid and pvd         HPI: Patient came to the office, for evaluation of multiple vascular issues including carotid artery stenosis, left subclavian angioplasty stent placement, status post aortofemoral bypass surgery, recently had a carotid ultrasound as well as a lower extremity artery Doppler study, at the hospital, wanted me look at them make recommendations after reviewing them regarding any additional work-up    Patient tells me he has retired, currently very active    No major health issues      Patient denies any focal lateralizing neurological symptoms like loss of speech, vision or loss of function of extremity    Patient can walk a few blocks , and denies any symptoms of rest pain    No Known Allergies    Current Outpatient Medications   Medication Sig Dispense Refill    atorvastatin (LIPITOR) 80 MG tablet TAKE 1 TABLET BY MOUTH EVERY DAY 90 tablet 1    metoprolol succinate (TOPROL XL) 25 MG extended release tablet TAKE 1 TABLET BY MOUTH EVERY DAY 90 tablet 1    lisinopril-hydroCHLOROthiazide (PRINZIDE;ZESTORETIC) 20-12.5 MG per tablet TAKE 1 TABLET BY MOUTH EVERY DAY 90 tablet 1    isosorbide mononitrate (IMDUR) 60 MG extended release tablet TAKE 1 TABLET BY MOUTH EVERY DAY 90 tablet 1    omeprazole (PRILOSEC) 40 MG delayed release capsule Take 1 capsule by mouth Daily 90 capsule 1    aspirin 81 MG tablet Take 81 mg by mouth daily      budesonide-formoterol (SYMBICORT) 80-4.5 MCG/ACT AERO Inhale 2 puffs into the lungs 2 times daily . Rinse mouth after use. 30.59 g 3    tiotropium (SPIRIVA RESPIMAT) 2.5 MCG/ACT AERS inhaler Inhale 2 puffs into the lungs daily 12 Inhaler 3    therapeutic multivitamin-minerals (THERAGRAN-M) tablet Take 1 tablet by mouth daily. No current facility-administered medications for this visit.        Past Medical History:   Diagnosis Date    Arthritis     Asthma     States Dr. Tamiko Ramirez says he has it, but does not take medication for this    Atherosclerotic peripheral vascular disease ([address] 2011    Blood transfusion     Unsure    CAD (coronary artery disease)     COPD (chronic obstructive pulmonary disease) ([facility] [address] 2011    GERD (gastroesophageal reflux disease)     History of blood transfusion     History of tobacco use 2011    Hyperlipidemia     Hypertension     Hypertension 2011    Left carotid stenosis 10/18/2019    PVD (peripheral vascular disease) with claudication ([address] 2013    S/P aorto-bifemoral bypass surgery 10/25/2012    S/P aortobifemoral bypass surgery 2017    Status post angioplasty with stent 2018       Past Surgical History:   Procedure Laterality Date    ANKLE SURGERY      lt    CARDIAC CATHETERIZATION  2017    Dr. Jimenez Ao- SVG-OM, SVG-PDA, LIMA-LAD- Open, SVG-CX closed   [facility] ENDOSCOPY      VASCULAR SURGERY  11    aorto-femoral bypass       Family History   Problem Relation Age of Onset    Heart Disease Mother     High Blood Pressure Mother     Diabetes Mother     High Cholesterol Mother     Stroke Mother     Heart Disease Father     High Blood Pressure Father     High Cholesterol Father     Asthma Sister     Heart Disease Brother     High Blood Pressure Brother     Cancer Brother     High Cholesterol Brother        Social History     Socioeconomic History    Marital status: Single     Spouse name: Not on file    Number of children: Not on file    Years of education: Not on file    Highest education level: Not on file   Occupational History    Not on file   Tobacco Use    Smoking status: Former Smoker     Packs/day: 2.00     Years: 20.00     Pack years: 40.00     Types: Cigarettes     Quit date: 10/25/1992     Years since quittin.0    Smokeless tobacco: Never Used   Vaping Use    Vaping Use: Never used   Substance and Sexual extremity. All the questions were answered. Indicated follow-up: Return in about 1 year (around 10/21/2022), or if symptoms worsen or fail to improve.

## 2022-04-20 ENCOUNTER — IMMUNIZATION (OUTPATIENT)
Dept: PRIMARY CARE CLINIC | Age: 72
End: 2022-04-20
Payer: MEDICARE

## 2022-04-20 PROCEDURE — 91305 COVID-19, PFIZER GRAY TOP, DO NOT DILUTE, TRIS-SUCROSE, 12+ YRS, PF, 30MCG/ 0.3 ML DOSE: CPT | Performed by: FAMILY MEDICINE

## 2022-04-20 PROCEDURE — 0054A COVID-19, PFIZER GRAY TOP, DO NOT DILUTE, TRIS-SUCROSE, 12+ YRS, PF, 30MCG/ 0.3 ML DOSE: CPT | Performed by: FAMILY MEDICINE

## 2022-09-27 DIAGNOSIS — Z95.820 STATUS POST ANGIOPLASTY WITH STENT: ICD-10-CM

## 2022-09-27 DIAGNOSIS — Z95.828 S/P AORTOBIFEMORAL BYPASS SURGERY: ICD-10-CM

## 2022-09-27 DIAGNOSIS — I73.9 PERIPHERAL VASCULAR DISEASE (HCC): ICD-10-CM

## 2022-09-27 DIAGNOSIS — I73.9 PVD (PERIPHERAL VASCULAR DISEASE) WITH CLAUDICATION (HCC): Primary | ICD-10-CM

## 2022-09-27 DIAGNOSIS — I65.23 BILATERAL CAROTID ARTERY STENOSIS: ICD-10-CM

## 2022-11-02 ENCOUNTER — TELEPHONE (OUTPATIENT)
Dept: VASCULAR SURGERY | Age: 72
End: 2022-11-02

## 2022-11-03 ENCOUNTER — OFFICE VISIT (OUTPATIENT)
Dept: VASCULAR SURGERY | Age: 72
End: 2022-11-03
Payer: MEDICARE

## 2022-11-03 VITALS — BODY MASS INDEX: 26.67 KG/M2 | WEIGHT: 176 LBS | HEIGHT: 68 IN

## 2022-11-03 DIAGNOSIS — I73.9 PVD (PERIPHERAL VASCULAR DISEASE) WITH CLAUDICATION (HCC): Primary | ICD-10-CM

## 2022-11-03 DIAGNOSIS — I65.22 LEFT CAROTID STENOSIS: ICD-10-CM

## 2022-11-03 DIAGNOSIS — R09.89 BRUIT OF LEFT CAROTID ARTERY: ICD-10-CM

## 2022-11-03 PROCEDURE — 1123F ACP DISCUSS/DSCN MKR DOCD: CPT | Performed by: SURGERY

## 2022-11-03 PROCEDURE — 99214 OFFICE O/P EST MOD 30 MIN: CPT | Performed by: SURGERY

## 2022-11-03 NOTE — PROGRESS NOTES
Chief Complaint:   Chief Complaint   Patient presents with    Circulatory Problem     Bruit of left carotid artery  PVD           HPI: Patient came to the office, for the evaluation of multiple vascular issues including carotid artery disease, peripheral vascular disease status post aortofemoral bypass done in 2011, overall doing well and staying active      Patient denies any focal lateralizing neurological symptoms like loss of speech, vision or loss of function of extremity    Patient can walk a few blocks, up to a few miles, does stationary bike also on a regular basis, and denies any symptoms of rest pain    No Known Allergies    Current Outpatient Medications   Medication Sig Dispense Refill    atorvastatin (LIPITOR) 80 MG tablet TAKE 1 TABLET BY MOUTH EVERY DAY 90 tablet 1    metoprolol succinate (TOPROL XL) 25 MG extended release tablet TAKE 1 TABLET BY MOUTH EVERY DAY 90 tablet 1    lisinopril-hydroCHLOROthiazide (PRINZIDE;ZESTORETIC) 20-12.5 MG per tablet TAKE 1 TABLET BY MOUTH EVERY DAY 90 tablet 1    isosorbide mononitrate (IMDUR) 60 MG extended release tablet TAKE 1 TABLET BY MOUTH EVERY DAY 90 tablet 1    omeprazole (PRILOSEC) 40 MG delayed release capsule Take 1 capsule by mouth Daily 90 capsule 1    aspirin 81 MG tablet Take 81 mg by mouth daily      budesonide-formoterol (SYMBICORT) 80-4.5 MCG/ACT AERO Inhale 2 puffs into the lungs 2 times daily . Rinse mouth after use. 30.59 g 3    tiotropium (SPIRIVA RESPIMAT) 2.5 MCG/ACT AERS inhaler Inhale 2 puffs into the lungs daily 12 Inhaler 3    therapeutic multivitamin-minerals (THERAGRAN-M) tablet Take 1 tablet by mouth daily. No current facility-administered medications for this visit.        Past Medical History:   Diagnosis Date    Arthritis     Asthma     States Dr. Neomia Romberg says he has it, but does not take medication for this    Atherosclerotic peripheral vascular disease ([facility] [address] 11/9/2011    Blood transfusion     Unsure    CAD (coronary artery disease)     COPD (chronic obstructive pulmonary disease) ([facility] 2011    GERD (gastroesophageal reflux disease)     History of blood transfusion     History of tobacco use 2011    Hyperlipidemia     Hypertension     Hypertension 2011    Left carotid stenosis 10/18/2019    PVD (peripheral vascular disease) with claudication ([facility] 2013    S/P aorto-bifemoral bypass surgery 10/25/2012    S/P aortobifemoral bypass surgery 2017    Status post angioplasty with stent 2018       Past Surgical History:   Procedure Laterality Date    ANKLE SURGERY      lt    CARDIAC CATHETERIZATION  2017    Dr. Forrest Calvin- SVG-OM, SVG-PDA, LIMA-LAD- Open, SVG-CX closed    CARDIAC SURGERY      HERNIA REPAIR      UPPER GASTROINTESTINAL ENDOSCOPY      VASCULAR SURGERY  11    aorto-femoral bypass       Family History   Problem Relation Age of Onset    Heart Disease Mother     High Blood Pressure Mother     Diabetes Mother     High Cholesterol Mother     Stroke Mother     Heart Disease Father     High Blood Pressure Father     High Cholesterol Father     Asthma Sister     Heart Disease Brother     High Blood Pressure Brother     Cancer Brother     High Cholesterol Brother        Social History     Socioeconomic History    Marital status: Single     Spouse name: Not on file    Number of children: Not on file    Years of education: Not on file    Highest education level: Not on file   Occupational History    Not on file   Tobacco Use    Smoking status: Former     Packs/day: 2.00     Years: 20.00     Pack years: 40.00     Types: Cigarettes     Quit date: 10/25/1992     Years since quittin.0    Smokeless tobacco: Never   Vaping Use    Vaping Use: Never used   Substance and Sexual Activity    Alcohol use:  Yes     Alcohol/week: 2.0 standard drinks     Types: 2 Cans of beer per week     Comment: occasional    Drug use: No    Sexual activity: Not on file   Other Topics Concern    Not on file   Social History Narrative    Not on file     Social Determinants of Health     Financial Resource Strain: Not on file   Food Insecurity: Not on file   Transportation Needs: Not on file   Physical Activity: Not on file   Stress: Not on file   Social Connections: Not on file   Intimate Partner Violence: Not on file   Housing Stability: Not on file       Review of Systems:    Eyes:  No blurring, diplopia or vision loss. Respiratory:  No cough, pleuritic chest pain, dyspnea, or wheezing. Chronic obstructive lung disease  Cardiovascular: No angina, palpitations . Hypertension, hyperlipidemia  Musculoskeletal:  No arthritis or weakness. Neurologic:  No paralysis, paresis, paresthesia, seizures or headache. Endocrinology:   Gastrointestinal: GERD        Physical Exam:  General appearance:  Alert, awake, oriented x 3. No distress. Eyes:  Conjunctivae appear normal; PERRL  Neck:  No jugular venous distention, lymphadenopathy or thyromegaly. Carotid bruit noted on the left  Lungs:  Clear to ausculation bilaterally. No rhonchi, crackles, wheezes  Heart:  Regular rate and rhythm. No rub or murmur  Abdomen:  Soft, non-tender. No masses, organomegaly. Musculoskeletal : No joint effusions, tenderness swelling    Neuro: Speech is intact. Moving all extremities. No focal motor or sensory deficits      Extremities:  Both feet are warm to touch. The color of both feet is normal.        Pulses Right  Left    Brachial 3 3    Radial    3=normal   Femoral 3 3  2=diminished   Popliteal    1=barely palpable   Dorsalis pedis 2 1-2  0=absent   Posterior tibial    4=aneurysmal             Other pertinent information:1. The past medical records were reviewed. 2.  The past vascular work-up was reviewed    Assessment:    1. PVD (peripheral vascular disease) with claudication ([address]    2. Bruit of left carotid artery    3.  Left carotid stenosis              Plan:       Discussed the patient, options, risks benefits and alternatives were explained, patient was recommended to follow-up lower extremity arterial Doppler study as well as a carotid ultrasound, call me few days after the testing is done to discuss the test results, as clinically patient is stable with stable vas disease, will see me every 2 years unless patient has symptoms, to call, explained, or unless the testing shows worsening of findings              Patient was instructed to continue walking program and to call if any worsening of symptoms and to call if any focal lateralizing neurological symptoms like loss of speech, vision or loss of function of extremity. All the questions were answered. Indicated follow-up: Return in about 1 year (around 11/3/2023), or if symptoms worsen or fail to improve.

## 2022-11-07 ENCOUNTER — TELEPHONE (OUTPATIENT)
Dept: VASCULAR SURGERY | Age: 72
End: 2022-11-07

## 2022-11-07 ENCOUNTER — HOSPITAL ENCOUNTER (OUTPATIENT)
Dept: CARDIOLOGY | Age: 72
Discharge: HOME | End: 2022-11-07

## 2022-11-07 DIAGNOSIS — I73.9 PERIPHERAL VASCULAR DISEASE (HCC): ICD-10-CM

## 2022-11-07 DIAGNOSIS — I73.9 PVD (PERIPHERAL VASCULAR DISEASE) WITH CLAUDICATION (HCC): ICD-10-CM

## 2022-11-07 DIAGNOSIS — I65.23 BILATERAL CAROTID ARTERY STENOSIS: ICD-10-CM

## 2022-11-07 DIAGNOSIS — Z95.820 STATUS POST ANGIOPLASTY WITH STENT: ICD-10-CM

## 2022-11-07 DIAGNOSIS — Z95.828 S/P AORTOBIFEMORAL BYPASS SURGERY: ICD-10-CM

## 2024-10-22 DIAGNOSIS — I73.9 PVD (PERIPHERAL VASCULAR DISEASE) WITH CLAUDICATION (HCC): Primary | ICD-10-CM

## 2024-10-22 DIAGNOSIS — I65.22 LEFT CAROTID STENOSIS: ICD-10-CM

## 2024-11-05 ENCOUNTER — TELEPHONE (OUTPATIENT)
Dept: VASCULAR SURGERY | Age: 74
End: 2024-11-05

## 2024-11-05 NOTE — TELEPHONE ENCOUNTER
Patient called to cancel office visit to see Dr. Huang on 11-7-24 to check carotid arteries and PVD, will call to reschedule.

## 2025-05-20 LAB
BASOPHILS # BLD: 0.01 K/UL (ref 0–0.2)
BASOPHILS NFR BLD: 0 % (ref 0–2)
EOSINOPHIL # BLD: 0.11 K/UL (ref 0.05–0.5)
EOSINOPHILS RELATIVE PERCENT: 3 % (ref 0–6)
ERYTHROCYTE [DISTWIDTH] IN BLOOD BY AUTOMATED COUNT: 13.8 % (ref 11.5–15)
HCT VFR BLD AUTO: 29.6 % (ref 37–54)
HGB BLD-MCNC: 10 G/DL (ref 12.5–16.5)
IMM GRANULOCYTES # BLD AUTO: <0.03 K/UL (ref 0–0.58)
IMM GRANULOCYTES NFR BLD: 0 % (ref 0–5)
LYMPHOCYTES NFR BLD: 0.98 K/UL (ref 1.5–4)
LYMPHOCYTES RELATIVE PERCENT: 29 % (ref 20–42)
MCH RBC QN AUTO: 37 PG (ref 26–35)
MCHC RBC AUTO-ENTMCNC: 33.8 G/DL (ref 32–34.5)
MCV RBC AUTO: 109.6 FL (ref 80–99.9)
MONOCYTES NFR BLD: 0.09 K/UL (ref 0.1–0.95)
MONOCYTES NFR BLD: 3 % (ref 2–12)
NEUTROPHILS NFR BLD: 64 % (ref 43–80)
NEUTS SEG NFR BLD: 2.15 K/UL (ref 1.8–7.3)
PLATELET # BLD AUTO: 234 K/UL (ref 130–450)
PMV BLD AUTO: 11 FL (ref 7–12)
RBC # BLD AUTO: 2.7 M/UL (ref 3.8–5.8)
WBC OTHER # BLD: 3.4 K/UL (ref 4.5–11.5)

## 2025-05-21 LAB — PATH REV BLD -IMP: NORMAL

## 2025-07-10 ENCOUNTER — HOSPITAL ENCOUNTER (OUTPATIENT)
Age: 75
Discharge: HOME | End: 2025-07-10
Payer: MEDICARE

## 2025-07-10 ENCOUNTER — HOSPITAL ENCOUNTER (OUTPATIENT)
Dept: CT IMAGING | Age: 75
Discharge: HOME | End: 2025-07-12
Payer: MEDICARE

## 2025-07-10 VITALS
TEMPERATURE: 97.8 F | RESPIRATION RATE: 18 BRPM | DIASTOLIC BLOOD PRESSURE: 78 MMHG | HEART RATE: 80 BPM | OXYGEN SATURATION: 100 % | SYSTOLIC BLOOD PRESSURE: 124 MMHG

## 2025-07-10 DIAGNOSIS — D84.9 IMMUNE DEFICIENCY DISORDER: ICD-10-CM

## 2025-07-10 DIAGNOSIS — D53.9 NUTRITIONAL ANEMIA: ICD-10-CM

## 2025-07-10 LAB
ANION GAP SERPL CALCULATED.3IONS-SCNC: 11 MMOL/L (ref 7–16)
BUN SERPL-MCNC: 15 MG/DL (ref 8–23)
CALCIUM SERPL-MCNC: 9.2 MG/DL (ref 8.8–10.2)
CHLORIDE SERPL-SCNC: 109 MMOL/L (ref 98–107)
CO2 SERPL-SCNC: 26 MMOL/L (ref 22–29)
CREAT SERPL-MCNC: 0.8 MG/DL (ref 0.7–1.2)
ERYTHROCYTE [DISTWIDTH] IN BLOOD BY AUTOMATED COUNT: 14.8 % (ref 11.5–15)
GFR, ESTIMATED: >90 ML/MIN/1.73M2
GLUCOSE SERPL-MCNC: 87 MG/DL (ref 74–99)
HCT VFR BLD AUTO: 35.4 % (ref 37–54)
HGB BLD-MCNC: 11.9 G/DL (ref 12.5–16.5)
INR PPP: 1.1
MCH RBC QN AUTO: 37.9 PG (ref 26–35)
MCHC RBC AUTO-ENTMCNC: 33.6 G/DL (ref 32–34.5)
MCV RBC AUTO: 112.7 FL (ref 80–99.9)
PLATELET # BLD AUTO: 127 K/UL (ref 130–450)
PMV BLD AUTO: 10.7 FL (ref 7–12)
POTASSIUM SERPL-SCNC: 4.3 MMOL/L (ref 3.5–5.1)
PROTHROMBIN TIME: 11.6 SEC (ref 9.3–12.4)
RBC # BLD AUTO: 3.14 M/UL (ref 3.8–5.8)
SODIUM SERPL-SCNC: 145 MMOL/L (ref 136–145)
WBC OTHER # BLD: 2.5 K/UL (ref 4.5–11.5)

## 2025-07-10 PROCEDURE — 80048 BASIC METABOLIC PNL TOTAL CA: CPT

## 2025-07-10 PROCEDURE — 7100000010 HC PHASE II RECOVERY - FIRST 15 MIN

## 2025-07-10 PROCEDURE — 7100000011 HC PHASE II RECOVERY - ADDTL 15 MIN

## 2025-07-10 PROCEDURE — 2720000010 CT BIOPSY BONE MARROW

## 2025-07-10 PROCEDURE — 6360000002 HC RX W HCPCS: Performed by: RADIOLOGY

## 2025-07-10 PROCEDURE — 85610 PROTHROMBIN TIME: CPT

## 2025-07-10 PROCEDURE — 36415 COLL VENOUS BLD VENIPUNCTURE: CPT

## 2025-07-10 PROCEDURE — 85027 COMPLETE CBC AUTOMATED: CPT

## 2025-07-10 RX ORDER — LIDOCAINE HYDROCHLORIDE 20 MG/ML
INJECTION, SOLUTION INFILTRATION; PERINEURAL PRN
Status: COMPLETED | OUTPATIENT
Start: 2025-07-10 | End: 2025-07-10

## 2025-07-10 RX ORDER — FENTANYL CITRATE 50 UG/ML
INJECTION, SOLUTION INTRAMUSCULAR; INTRAVENOUS PRN
Status: COMPLETED | OUTPATIENT
Start: 2025-07-10 | End: 2025-07-10

## 2025-07-10 RX ORDER — MIDAZOLAM HYDROCHLORIDE 2 MG/2ML
INJECTION, SOLUTION INTRAMUSCULAR; INTRAVENOUS PRN
Status: COMPLETED | OUTPATIENT
Start: 2025-07-10 | End: 2025-07-10

## 2025-07-10 RX ADMIN — LIDOCAINE HYDROCHLORIDE 10 ML: 20 INJECTION, SOLUTION INFILTRATION; PERINEURAL at 08:38

## 2025-07-10 RX ADMIN — MIDAZOLAM HYDROCHLORIDE 1 MG: 1 INJECTION, SOLUTION INTRAMUSCULAR; INTRAVENOUS at 08:38

## 2025-07-10 RX ADMIN — FENTANYL CITRATE 50 MCG: 50 INJECTION, SOLUTION INTRAMUSCULAR; INTRAVENOUS at 08:38

## 2025-07-10 ASSESSMENT — PAIN SCALES - GENERAL
PAINLEVEL_OUTOF10: 0
PAINLEVEL_OUTOF10: 0

## 2025-07-10 NOTE — OR NURSING
Patient brought into CT room and procedure explained by Dr. Young. Procedural consent obtained. Patient placed prone on CT table with O2 and patient placed on monitor. Using CT, needle inserted and biopsy obtained from left iliac by Dr. Young. 20 cc blood aspirate and (1) core biopsy obtained and sent to lab.  Patient re-scanned and imaging reviewed by Dr. Young. Biopsy site cleaned and dressing applied. Patient report called to PACU and then escorted to Stage II recovery.

## 2025-07-10 NOTE — PRE SEDATION
MD Qasim   isosorbide mononitrate (IMDUR) 60 MG extended release tablet TAKE 1 TABLET BY MOUTH EVERY DAY 5/29/20  Yes Qasim Thorne MD   omeprazole (PRILOSEC) 40 MG delayed release capsule Take 1 capsule by mouth Daily 8/19/19  Yes Qasim Thorne MD   budesonide-formoterol (SYMBICORT) 80-4.5 MCG/ACT AERO Inhale 2 puffs into the lungs 2 times daily . Rinse mouth after use. 7/2/19  Yes Qasim Thorne MD   tiotropium (SPIRIVA RESPIMAT) 2.5 MCG/ACT AERS inhaler Inhale 2 puffs into the lungs daily 9/17/18  Yes Qasim Thorne MD   therapeutic multivitamin-minerals (THERAGRAN-M) tablet Take 1 tablet by mouth daily   Yes Sarah Will MD   aspirin 81 MG tablet Take 1 tablet by mouth daily    ProviderSarah MD     Coumadin Use Last 7 Days:  no  Antiplatelet drug therapy use last 7 days: yes - Baby aspirin last dose 7/4/25  Other anticoagulant use last 7 days: no  Additional Medication Information:  n/a      Pre-Sedation Documentation and Exam:   I have reviewed the patient's history and review of systems.    Mallampati Airway Assessment:  Mallampati Class III - (soft palate & base of uvula are visible)    Prior History of Anesthesia Complications:   none    ASA Classification:  Class 2 - A normal healthy patient with mild systemic disease    Sedation/ Anesthesia Plan:   intravenous sedation    Medications Planned:   midazolam (Versed) intravenously and fentanyl intravenously    Patient is an appropriate candidate for plan of sedation: yes    Electronically signed by Álvaro Young MD on 7/10/2025 at 8:24 AM

## 2025-07-29 LAB — BONE MARROW REPORT: NORMAL
